# Patient Record
Sex: FEMALE | Race: AMERICAN INDIAN OR ALASKA NATIVE
[De-identification: names, ages, dates, MRNs, and addresses within clinical notes are randomized per-mention and may not be internally consistent; named-entity substitution may affect disease eponyms.]

---

## 2023-07-27 ENCOUNTER — HOSPITAL ENCOUNTER (OUTPATIENT)
Dept: HOSPITAL 47 - RADUSWWP | Age: 53
Discharge: HOME | End: 2023-07-27
Attending: INTERNAL MEDICINE
Payer: COMMERCIAL

## 2023-07-27 DIAGNOSIS — K83.8: ICD-10-CM

## 2023-07-27 DIAGNOSIS — Z80.3: ICD-10-CM

## 2023-07-27 DIAGNOSIS — R94.5: ICD-10-CM

## 2023-07-27 DIAGNOSIS — K76.0: ICD-10-CM

## 2023-07-27 DIAGNOSIS — Z12.31: ICD-10-CM

## 2023-07-27 DIAGNOSIS — Z01.419: Primary | ICD-10-CM

## 2023-07-27 DIAGNOSIS — Z90.49: ICD-10-CM

## 2023-07-27 PROCEDURE — 77063 BREAST TOMOSYNTHESIS BI: CPT

## 2023-07-27 PROCEDURE — 76705 ECHO EXAM OF ABDOMEN: CPT

## 2023-07-27 PROCEDURE — 77067 SCR MAMMO BI INCL CAD: CPT

## 2023-07-27 NOTE — US
EXAMINATION TYPE: US liver

 

DATE OF EXAM: 7/27/2023

 

COMPARISON: NONE

 

CLINICAL INDICATION: Female, 53 years old with history of R94.5 ABNORMAL RESULTS OF LIVER FUNCTION ST
UDIES; Elevated liver enzymes. Cholecystectomy. RUQ pain 

 

TECHNIQUE: Multiple sonographic images of the right upper quadrant are obtained.

 

FINDINGS:

 

EXAM MEASUREMENTS:

 

Liver Length:  18.8 cm   

Gallbladder:  Surgically absent  

CBD:  0.9 cm

Right Kidney:  13.7 x 5.6 x 5.5 cm

 

SONOGRAPHER NOTES: *Technical limitations due to patient's body habitus and overlying bowel gas 

Unable

Pancreas:  Obscured by bowel gas

Liver:  attenuating. Unable to penetrate   

Gallbladder:  Surgically absent

**Evidence for sonographic Galinod's sign:  no

CBD: Dilated.

Right Kidney:  no evidence of hydronephrosis  

 

 

 

IMPRESSION: 

 

1. Severe hepatic steatosis. Correlate with LFTs, lipid profile, patient risk factors. The degree of 
fatty infiltration limits assessment for focal lesions.

2. Bile duct dilated at 9 mm may be normal for the patient status post cholecystectomy.

## 2023-07-28 NOTE — MM
Reason for Exam: Screening  (asymptomatic). 

Last mammogram was performed 1 year(s) and 1 month(s) ago. 





Patient History: 

Menarche at age 11. Patient has no children.

Paternal cousin had breast cancer, age 50. Paternal aunt had breast cancer, age 60. 





Risk Values: 

Candy 5 year model risk: 1.3%.

NCI Lifetime model risk: 10.3%.





Prior Study Comparison: 

5/27/2011 Bilateral Screening Mammogram, [facility]. 6/3/2022 Bilateral MG screening mammo w CAD, [facility]. 





Tissue Density: 

There are scattered fibroglandular densities.





Findings: 

Analyzed By CAD. 

There is no suspicious group of microcalcifications or new suspicious mass in either breast. 





Overall Assessment: Benign, BI-RAD 2





Management: 

Screening Mammogram of both breasts in 1 year.

.



Patient should continue monthly self-breast exams.  A clinical breast exam by your physician is

recommended on an annual basis.

This exam should not preclude additional follow-up of suspicious palpable abnormalities.



Note on Candy scores and lifetime risk:

1. A Candy score greater than 3% is considered moderate risk. If this is the case, consider

specialist referral to assess eligibility for a risk reducing agent.

2. If overall lifetime risk for the development of breast cancer is 20% or higher, the patient may

qualify for future screening with alternating mammogram and breast MRI.



Electronically signed and approved by: Leopold M. Fregoli, M.D. Radiologis

## 2024-09-13 ENCOUNTER — HOSPITAL ENCOUNTER (OUTPATIENT)
Dept: HOSPITAL 47 - EC | Age: 54
Setting detail: OBSERVATION
LOS: 2 days | Discharge: HOME | End: 2024-09-15
Attending: HOSPITALIST | Admitting: HOSPITALIST
Payer: COMMERCIAL

## 2024-09-13 DIAGNOSIS — E11.65: ICD-10-CM

## 2024-09-13 DIAGNOSIS — Z79.4: ICD-10-CM

## 2024-09-13 DIAGNOSIS — E78.5: ICD-10-CM

## 2024-09-13 DIAGNOSIS — Z91.141: ICD-10-CM

## 2024-09-13 DIAGNOSIS — E55.9: ICD-10-CM

## 2024-09-13 DIAGNOSIS — F41.9: ICD-10-CM

## 2024-09-13 DIAGNOSIS — I10: ICD-10-CM

## 2024-09-13 DIAGNOSIS — R07.89: Primary | ICD-10-CM

## 2024-09-13 DIAGNOSIS — Z79.1: ICD-10-CM

## 2024-09-13 DIAGNOSIS — Z79.84: ICD-10-CM

## 2024-09-13 DIAGNOSIS — Z79.899: ICD-10-CM

## 2024-09-13 PROCEDURE — 80053 COMPREHEN METABOLIC PANEL: CPT

## 2024-09-13 PROCEDURE — 84484 ASSAY OF TROPONIN QUANT: CPT

## 2024-09-13 PROCEDURE — 83036 HEMOGLOBIN GLYCOSYLATED A1C: CPT

## 2024-09-13 PROCEDURE — 99285 EMERGENCY DEPT VISIT HI MDM: CPT

## 2024-09-13 PROCEDURE — 80048 BASIC METABOLIC PNL TOTAL CA: CPT

## 2024-09-13 PROCEDURE — 85610 PROTHROMBIN TIME: CPT

## 2024-09-13 PROCEDURE — 83735 ASSAY OF MAGNESIUM: CPT

## 2024-09-13 PROCEDURE — 81003 URINALYSIS AUTO W/O SCOPE: CPT

## 2024-09-13 PROCEDURE — 85730 THROMBOPLASTIN TIME PARTIAL: CPT

## 2024-09-13 PROCEDURE — 96361 HYDRATE IV INFUSION ADD-ON: CPT

## 2024-09-13 PROCEDURE — 36415 COLL VENOUS BLD VENIPUNCTURE: CPT

## 2024-09-13 PROCEDURE — 96360 HYDRATION IV INFUSION INIT: CPT

## 2024-09-13 PROCEDURE — 71046 X-RAY EXAM CHEST 2 VIEWS: CPT

## 2024-09-13 PROCEDURE — 93005 ELECTROCARDIOGRAM TRACING: CPT

## 2024-09-13 PROCEDURE — 85025 COMPLETE CBC W/AUTO DIFF WBC: CPT

## 2024-09-14 LAB
ALBUMIN SERPL-MCNC: 4.7 G/DL (ref 3.5–5)
ALP SERPL-CCNC: 198 U/L (ref 38–126)
ALT SERPL-CCNC: 57 U/L (ref 4–34)
ANION GAP SERPL CALC-SCNC: 15 MMOL/L
APTT BLD: 23.9 SEC (ref 22–30)
AST SERPL-CCNC: 44 U/L (ref 14–36)
BASOPHILS # BLD AUTO: 0.1 K/UL (ref 0–0.2)
BASOPHILS NFR BLD AUTO: 1 %
BUN SERPL-SCNC: 12 MG/DL (ref 7–17)
CALCIUM SPEC-MCNC: 9.8 MG/DL (ref 8.4–10.2)
CHLORIDE SERPL-SCNC: 99 MMOL/L (ref 98–107)
CO2 SERPL-SCNC: 19 MMOL/L (ref 22–30)
EOSINOPHIL # BLD AUTO: 0.2 K/UL (ref 0–0.7)
EOSINOPHIL NFR BLD AUTO: 2 %
ERYTHROCYTE [DISTWIDTH] IN BLOOD BY AUTOMATED COUNT: 5 M/UL (ref 3.8–5.4)
ERYTHROCYTE [DISTWIDTH] IN BLOOD: 13.1 % (ref 11.5–15.5)
GLUCOSE BLD-MCNC: 265 MG/DL (ref 70–110)
GLUCOSE BLD-MCNC: 278 MG/DL (ref 70–110)
GLUCOSE BLD-MCNC: 279 MG/DL (ref 70–110)
GLUCOSE BLD-MCNC: 328 MG/DL (ref 70–110)
GLUCOSE BLD-MCNC: 341 MG/DL (ref 70–110)
GLUCOSE BLD-MCNC: 383 MG/DL (ref 70–110)
GLUCOSE SERPL-MCNC: 365 MG/DL (ref 74–99)
GLUCOSE UR QL: (no result)
HCT VFR BLD AUTO: 41.9 % (ref 34–46)
HGB BLD-MCNC: 13.5 GM/DL (ref 11.4–16)
INR PPP: 0.9 (ref ?–1.2)
KETONES UR QL STRIP.AUTO: (no result)
LYMPHOCYTES # SPEC AUTO: 3.8 K/UL (ref 1–4.8)
LYMPHOCYTES NFR SPEC AUTO: 37 %
MAGNESIUM SPEC-SCNC: 1.6 MG/DL (ref 1.6–2.3)
MCH RBC QN AUTO: 27.1 PG (ref 25–35)
MCHC RBC AUTO-ENTMCNC: 32.3 G/DL (ref 31–37)
MCV RBC AUTO: 83.8 FL (ref 80–100)
MONOCYTES # BLD AUTO: 0.6 K/UL (ref 0–1)
MONOCYTES NFR BLD AUTO: 6 %
NEUTROPHILS # BLD AUTO: 5.3 K/UL (ref 1.3–7.7)
NEUTROPHILS NFR BLD AUTO: 51 %
PH UR: 5.5 [PH] (ref 5–8)
PLATELET # BLD AUTO: 428 K/UL (ref 150–450)
POTASSIUM SERPL-SCNC: 4 MMOL/L (ref 3.5–5.1)
PROT SERPL-MCNC: 7.6 G/DL (ref 6.3–8.2)
PT BLD: 10 SEC (ref 10–12.5)
SODIUM SERPL-SCNC: 133 MMOL/L (ref 137–145)
SP GR UR: 1.04 (ref 1–1.03)
UROBILINOGEN UR QL STRIP: <2 MG/DL (ref ?–2)
WBC # BLD AUTO: 10.2 K/UL (ref 3.8–10.6)

## 2024-09-14 RX ADMIN — CEFAZOLIN SCH MLS/HR: 330 INJECTION, POWDER, FOR SOLUTION INTRAMUSCULAR; INTRAVENOUS at 06:43

## 2024-09-14 RX ADMIN — INSULIN HUMAN ONE UNIT: 100 INJECTION, SOLUTION PARENTERAL at 03:20

## 2024-09-14 RX ADMIN — IBUPROFEN PRN MG: 600 TABLET ORAL at 13:47

## 2024-09-14 RX ADMIN — ASPIRIN 81 MG CHEWABLE TABLET STA MG: 81 TABLET CHEWABLE at 03:22

## 2024-09-14 RX ADMIN — CEFAZOLIN STA MLS/HR: 330 INJECTION, POWDER, FOR SOLUTION INTRAMUSCULAR; INTRAVENOUS at 03:20

## 2024-09-14 RX ADMIN — ACETAMINOPHEN PRN MG: 325 TABLET, FILM COATED ORAL at 06:43

## 2024-09-14 RX ADMIN — INSULIN ASPART SCH UNIT: 100 INJECTION, SOLUTION INTRAVENOUS; SUBCUTANEOUS at 13:02

## 2024-09-14 NOTE — P.CRDCN
History of Present Illness


Consult date: 09/14/24


History of present illness: 





The patient is a very pleasant 54-year-old female patient with a past medical 

history significant for overweight as well as diabetes and hypertension and 

dyslipidemia presented to the hospital complaining of uncontrolled diabetes and 

elevated blood glucose.  The patient currently is under a lot of stress taking 

care of multiple family member as well as stress related to job and 

affordability of her medications including the insulin.  She has not been 

compliant in taking her diabetic medications including insulin intermittently 

because she could not afford some of them.  She noticed that her glucose in the 

blood has been severely elevated and reaching almost 400.  She decided to come 

for further evaluation.  She was experiencing symptoms of pressure in the head 

and sometimes she reports intermittent episode of chest discomfort appears to be

overall atypical.  No shortness of breath and no dizziness or lightheadedness 

and no feeling of heart racing or fluttering and no presyncope or syncope and no

edema in the lower extremities.  Further investigation performed including blood

work showed severely elevated glucose and also she underwent troponin with 3 

sets came in to be unremarkable and EKG showing sinus mechanism with no s

ignificant ST or T wave abnormalities.  No history of coronary artery disease or

congestive heart failure or cardiac arrhythmia and never seen a cardiologist 

before.  Her pressure appears to be under good control on the current dose of 

lisinopril which she is on.  The physical examination is remarkable for stable 

vital signs with regular rate and rhythm and distant heart sounds and clear 

breathing sounds bilaterally and no edema was noted in the lower extremities





Assessment


Uncontrolled diabetes


Noncompliance with medications secondary to affordability


Atypical versus noncardiac chest discomfort


Multiple comorbid conditions





Plan


Acute coronary event was ruled out


Currently the patient is asymptomatic


I would not advise any further cardiac workup at this point probably she will 

benefit from stress probably as an outpatient


Monitor the patient for additional 24-hour





Past Medical History


Past Medical History: Diabetes Mellitus


Additional Past Medical History / Comment(s): back pain, herniated disc/bulges


History of Any Multi-Drug Resistant Organisms: None Reported


Past Surgical History: Cholecystectomy


Past Psychological History: No Psychological Hx Reported


Smoking Status: Never smoker


Past Alcohol Use History: None Reported


Past Drug Use History: None Reported





Medications and Allergies


                                Home Medications











 Medication  Instructions  Recorded  Confirmed  Type


 


ALPRAZolam [Xanax] 0.25 mg PO BID PRN 09/14/24 09/14/24 History


 


Cyclobenzaprine [Flexeril] 10 mg PO DAILY 09/14/24 09/14/24 History


 


Ergocalciferol (Vitamin D2) 1,250 mcg PO MO 09/14/24 09/14/24 History





[Drisdol (50,000 Iu)]    


 


Insulin Glargine,Hum.rec.anlog 40 units SQ DAILY 09/14/24 09/14/24 History





[Lantus Solostar Pen]    


 


Meloxicam [Mobic] 7.5 mg PO DAILY PRN 09/14/24 09/14/24 History


 


Metoprolol Succinate (ER) [Toprol 25 mg PO DAILY 09/14/24 09/14/24 History





Xl]    


 


Omeprazole [PriLOSEC] 20 mg PO DAILY 09/14/24 09/14/24 History


 


Pioglitazone [Actos] 15 mg PO DAILY 09/14/24 09/14/24 History


 


lisinopriL [Zestril] 10 mg PO DAILY 09/14/24 09/14/24 History


 


metFORMIN HCL [Glucophage] 1,000 mg PO BID 09/14/24 09/14/24 History








                                    Allergies











Allergy/AdvReac Type Severity Reaction Status Date / Time


 


No Known Allergies Allergy   Verified 09/14/24 09:06














Physical Exam


Vitals: 


                                   Vital Signs











  Temp Pulse Pulse Resp BP BP Pulse Ox


 


 09/14/24 13:18  97.9 F   104 H  16   125/81  97


 


 09/14/24 13:04  97.6 F  101 H   20  143/72   95


 


 09/14/24 09:07  98.6 F  94   18  126/60   96


 


 09/14/24 06:44   97   18  130/69   97


 


 09/14/24 02:41   100   18  140/82   97


 


 09/13/24 23:53  97.9 F  111 H   20  162/77   98








                                Intake and Output











 09/14/24 09/14/24 09/14/24





 06:59 14:59 22:59


 


Other:   


 


  # Voids  1 


 


  Weight 127.006 kg 127.006 kg 














Results





                                 09/14/24 02:58





                                 09/14/24 02:58


                                 Cardiac Enzymes











  09/14/24 09/14/24 09/14/24 Range/Units





  02:58 02:58 06:38 


 


AST  44 H    (14-36)  U/L


 


Troponin I   <0.012  <0.012  (0.000-0.034)  ng/mL














  09/14/24 Range/Units





  09:29 


 


AST   (14-36)  U/L


 


Troponin I  <0.012  (0.000-0.034)  ng/mL








                                   Coagulation











  09/14/24 Range/Units





  02:58 


 


PT  10.0  (10.0-12.5)  sec


 


APTT  23.9  (22.0-30.0)  sec








                                       CBC











  09/14/24 Range/Units





  02:58 


 


WBC  10.2  (3.8-10.6)  k/uL


 


RBC  5.00  (3.80-5.40)  m/uL


 


Hgb  13.5  (11.4-16.0)  gm/dL


 


Hct  41.9  (34.0-46.0)  %


 


Plt Count  428  (150-450)  k/uL








                          Comprehensive Metabolic Panel











  09/14/24 Range/Units





  02:58 


 


Sodium  133 L  (137-145)  mmol/L


 


Potassium  4.0  (3.5-5.1)  mmol/L


 


Chloride  99  ()  mmol/L


 


Carbon Dioxide  19 L  (22-30)  mmol/L


 


BUN  12  (7-17)  mg/dL


 


Creatinine  0.43 L  (0.52-1.04)  mg/dL


 


Glucose  365 H  (74-99)  mg/dL


 


Calcium  9.8  (8.4-10.2)  mg/dL


 


AST  44 H  (14-36)  U/L


 


ALT  57 H  (4-34)  U/L


 


Alkaline Phosphatase  198 H  ()  U/L


 


Total Protein  7.6  (6.3-8.2)  g/dL


 


Albumin  4.7  (3.5-5.0)  g/dL








                               Current Medications











Generic Name Dose Route Start Last Admin





  Trade Name Aayushq  PRN Reason Stop Dose Admin


 


Acetaminophen  650 mg  09/14/24 04:38  09/14/24 06:43





  Acetaminophen Tab 325 Mg Tab  PO   650 mg





  Q6HR PRN   Administration





  Mild Pain or Fever > 100.5  


 


Alprazolam  0.25 mg  09/14/24 10:46 





  Alprazolam 0.25 Mg Tab  PO  





  BID PRN  





  Anxiety  


 


Cyclobenzaprine HCl  10 mg  09/15/24 09:00 





  Cyclobenzaprine 10 Mg Tab  PO  





  DAILY JERICA  


 


Dextrose/Water  25 ml  09/14/24 12:35 





  Dextrose 50% Syringe 50 Ml  IVP  





  PER PROTOCOL PRN  





  Hypoglycemia  





  Protocol  


 


Dextrose/Water  50 ml  09/14/24 12:35 





  Dextrose 50% Syringe 50 Ml  IVP  





  PER PROTOCOL PRN  





  Hypoglycemia  





  Protocol  


 


Sodium Chloride  1,000 mls @ 10 mls/hr  09/14/24 04:45  09/14/24 16:26





  Saline 0.9%  IV   Not Given





  .Q24H JERICA  


 


Ibuprofen  600 mg  09/14/24 13:33  09/14/24 13:47





  Ibuprofen 600 Mg Tab  PO   600 mg





  QID PRN   Administration





  Mild Pain (Scale 1 to 3)  


 


Insulin Aspart  0 unit  09/14/24 12:45  09/14/24 13:02





  Insulin Aspart (Novolog) 100 Unit/Ml Vial  SQ   15 unit





  ACHS JERICA   Administration





  Protocol  


 


Lisinopril  10 mg  09/15/24 09:00 





  Lisinopril 10 Mg Tab  PO  





  DAILY [facility]  


 


Metoprolol Succinate  25 mg  09/15/24 09:00 





  Metoprolol Succinate (Er) 25 Mg Tab.Er.24h  PO  





  DAILY [facility]  


 


Naloxone HCl  0.2 mg  09/14/24 04:38 





  Naloxone 0.4 Mg/Ml 1 Ml Vial  IV  





  Q2M PRN  





  Opioid Reversal  


 


Pantoprazole Sodium  40 mg  09/15/24 07:30 





  Pantoprazole 40 Mg Tablet  PO  





  AC-BRKFST [facility]  








                                Intake and Output











 09/14/24 09/14/24 09/14/24





 06:59 14:59 22:59


 


Other:   


 


  # Voids  1 


 


  Weight 127.006 kg 127.006 kg 








                                 Patient Weight











 09/15/24





 06:59


 


Weight 127.006 kg








                                        





                                 09/14/24 02:58 





                                 09/14/24 02:58

## 2024-09-14 NOTE — XR
EXAMINATION TYPE: XR chest 2V

 

DATE OF EXAM: 9/14/2024 6:15 AM

 

CLINICAL INDICATION: Female, 54 years old with history of Chest Pain; [facility]

 

COMPARISON: Chest radiographs from 11/14/2013

 

TECHNIQUE: XR chest 2V Frontal view of the chest.

 

FINDINGS: 

Lungs/Pleura: There is no evidence of pleural effusion, focal consolidation, or pneumothorax.  

Pulmonary vascularity: Unremarkable.

Heart/mediastinum: Cardiomediastinal silhouette is unremarkable.

Musculoskeletal: No acute osseous pathology.

 

Other findings: None

 

IMPRESSION: 

No acute cardiopulmonary disease/process.

## 2024-09-14 NOTE — P.HPIM
History of Present Illness


H&P Date: 09/14/24


Chief Complaint: Chest pain





54-year-old female, history of hypertension, diabetes mellitus 2, DJD/back pain,

presenting with chief complaint of elevated blood sugar.  Patient states that 

she normally does not check her blood sugar but states that today she was 

getting headache, blurred vision, and dry mouth.  She states that when she 

checked her blood sugar the machine read high.  She states that she has been 

under a lot of stress recently.  She did take her Lantus late today.  Patient 

also reports diarrhea, nausea, chest pain.  Chest pain located on the left side 

with some radiation into the left arm.  No difficulty breathing.  No cough, 

congestion, sore throat.  States that she is having dysuria and urinary urgency 

and frequency.  No fevers or chills.


Blood work completed in ED reveals a WBC of 10.2, hemoglobin of 13.5 and 

platelet count of 428, sodium 133, potassium 4.0, BUNs/creatinine of 12/0.43 and

blood glucose of 365, troponin is less than 0.012





Review of Systems











REVIEW OF SYSTEMS: 


CONSTITUTIONAL: No fever, no malaise, no fatigue. 


HEENT: No recent visual problems or hearing problems. Denied any sore throat. 


CARDIOVASCULAR: No chest pain, orthopnea, PND, no palpitations, no syncope. 


PULMONARY: No shortness of breath, no cough, no hemoptysis. 


GASTROINTESTINAL: No diarrhea, no nausea, no vomiting, no abdominal pain. 


NEUROLOGICAL: No headaches, no weakness, no numbness. 


HEMATOLOGICAL: Denies any bleeding or petechiae. 


GENITOURINARY: Denies any burning micturition, frequency, or urgency. 


MUSCULOSKELETAL/RHEUMATOLOGICAL: Denies any joint pain, swelling, or any muscle 

pain. 


ENDOCRINE: Denies any polyuria or polydipsia. 





The rest of the 14-point review of systems is negative.








Past Medical History


Past Medical History: Diabetes Mellitus


Additional Past Medical History / Comment(s): back pain


History of Any Multi-Drug Resistant Organisms: None Reported


Past Surgical History: Cholecystectomy


Past Psychological History: No Psychological Hx Reported


Smoking Status: Never smoker


Past Alcohol Use History: None Reported


Past Drug Use History: None Reported





Medications and Allergies


                                Home Medications











 Medication  Instructions  Recorded  Confirmed  Type


 


ALPRAZolam [Xanax] 0.25 mg PO BID PRN 09/14/24 09/14/24 History


 


Cyclobenzaprine [Flexeril] 10 mg PO DAILY 09/14/24 09/14/24 History


 


Ergocalciferol (Vitamin D2) 1,250 mcg PO MO 09/14/24 09/14/24 History





[Drisdol (50,000 Iu)]    


 


Insulin Glargine,Hum.rec.anlog 40 units SQ DAILY 09/14/24 09/14/24 History





[Lantus Solostar Pen]    


 


Meloxicam [Mobic] 7.5 mg PO DAILY PRN 09/14/24 09/14/24 History


 


Metoprolol Succinate (ER) [Toprol 25 mg PO DAILY 09/14/24 09/14/24 History





Xl]    


 


Omeprazole [PriLOSEC] 20 mg PO DAILY 09/14/24 09/14/24 History


 


Pioglitazone [Actos] 15 mg PO DAILY 09/14/24 09/14/24 History


 


lisinopriL [Zestril] 10 mg PO DAILY 09/14/24 09/14/24 History


 


metFORMIN HCL [Glucophage] 1,000 mg PO BID 09/14/24 09/14/24 History








                                    Allergies











Allergy/AdvReac Type Severity Reaction Status Date / Time


 


No Known Allergies Allergy   Verified 09/14/24 09:06














Physical Exam


Vitals: 


                                   Vital Signs











  Temp Pulse Resp BP Pulse Ox


 


 09/14/24 09:07  98.6 F  94  18  126/60  96


 


 09/14/24 06:44   97  18  130/69  97


 


 09/14/24 02:41   100  18  140/82  97


 


 09/13/24 23:53  97.9 F  111 H  20  162/77  98








                                Intake and Output











 09/13/24 09/14/24 09/14/24





 22:59 06:59 14:59


 


Other:   


 


  Weight  127.006 kg 














General appearance: alert, in no apparent distress


Head exam: Present: atraumatic, normocephalic


Eye exam: Present: normal appearance, EOMI


Neck exam: Present: normal inspection.  Absent: meningismus


Respiratory exam: Present: normal lung sounds bilaterally.  Absent: respiratory 

distress, wheezes, rales, rhonchi, stridor


Cardiovascular Exam: Present: regular rate, normal rhythm, normal heart sounds. 

 Absent: systolic murmur, diastolic murmur, rubs, gallop, clicks


Neurological exam: Present: alert, oriented X3


Psychiatric exam: Present: normal affect, normal mood


Skin exam: Present: warm, dry





Results


CBC & Chem 7: 


                                 09/14/24 02:58





                                 09/14/24 02:58


Labs: 


                  Abnormal Lab Results - Last 24 Hours (Table)











  09/14/24 09/14/24 09/14/24 Range/Units





  02:35 02:58 06:50 


 


Sodium   133 L   (137-145)  mmol/L


 


Carbon Dioxide   19 L   (22-30)  mmol/L


 


Creatinine   0.43 L   (0.52-1.04)  mg/dL


 


Glucose   365 H   (74-99)  mg/dL


 


POC Glucose (mg/dL)  341 H   328 H  ()  mg/dL


 


AST   44 H   (14-36)  U/L


 


ALT   57 H   (4-34)  U/L


 


Alkaline Phosphatase   198 H   ()  U/L














Assessment and Plan


Assessment: 





1.  Chest pain rule out acute coronary syndrome


-Patient has been admitted to telemetry; monitor EKG and cycle troponins


-- Recommend 2D echo


-Cardiology has been consulted





2.  Hypertension; continue with home dose of lisinopril 10 mg daily and 

metoprolol succinate 25 mg daily





3.  Diabetes mellitus type 2 with long-term insulin use/hyperglycemia


-Patient uses insulin glargine 40 units daily along with Actos 15 mg daily


-Long-acting insulin placed on hold while in hospital' we will monitor Accu-

Cheks q. ACH S with insulin sliding scale





4.  Anxiety; continue with home dose of Xanax





5.  Vitamin D deficiency; patient will continue with supplement outpatient





VTE prophylaxis; SCDs


CODE STATUS; full code

## 2024-09-14 NOTE — ED
General Adult HPI





- General


Chief complaint: Recheck/Abnormal Lab/Rx


Stated complaint: Diabetic Issues


Time Seen by Provider: 09/14/24 02:36


Source: patient


Mode of arrival: ambulatory


Limitations: no limitations





- History of Present Illness


Initial comments: 


54-year-old female with history of type 2 diabetes presenting with chief 

complaint of elevated blood sugar.  Patient states that she normally does not 

check her blood sugar but states that today she was getting headache, blurred 

vision, and dry mouth.  She states that when she checked her blood sugar the 

machine read high.  She states that she has been under a lot of stress recently.

 She did take her Lantus late today.  Patient also reports diarrhea, nausea, 

chest pain.  Chest pain located on the left side with some radiation into the 

left arm.  No difficulty breathing.  No cough, congestion, sore throat.  States 

that she is having dysuria and urinary urgency and frequency.  No fevers or 

chills.








- Related Data


                                Home Medications











 Medication  Instructions  Recorded  Confirmed


 


ALPRAZolam [Xanax] 0.25 mg PO BID PRN 09/14/24 09/14/24


 


Cyclobenzaprine [Flexeril] 10 mg PO DAILY 09/14/24 09/14/24


 


Ergocalciferol (Vitamin D2) 1,250 mcg PO MO 09/14/24 09/14/24





[Drisdol (50,000 Iu)]   


 


Insulin Glargine,Hum.rec.anlog 40 units SQ DAILY 09/14/24 09/14/24





[Lantus Solostar Pen]   


 


Meloxicam [Mobic] 7.5 mg PO DAILY PRN 09/14/24 09/14/24


 


Metoprolol Succinate (ER) [Toprol 25 mg PO DAILY 09/14/24 09/14/24





Xl]   


 


Omeprazole [PriLOSEC] 20 mg PO DAILY 09/14/24 09/14/24


 


Pioglitazone [Actos] 15 mg PO DAILY 09/14/24 09/14/24


 


lisinopriL [Zestril] 10 mg PO DAILY 09/14/24 09/14/24


 


metFORMIN HCL [Glucophage] 1,000 mg PO BID 09/14/24 09/14/24











                                    Allergies











Allergy/AdvReac Type Severity Reaction Status Date / Time


 


No Known Allergies Allergy   Verified 09/14/24 09:06














Review of Systems


ROS Statement: 


Those systems with pertinent positive or pertinent negative responses have been 

documented in the HPI.





ROS Other: All systems not noted in ROS Statement are negative.





Past Medical History


Past Medical History: Diabetes Mellitus


Additional Past Medical History / Comment(s): back pain


History of Any Multi-Drug Resistant Organisms: None Reported


Past Surgical History: Cholecystectomy


Past Psychological History: No Psychological Hx Reported


Smoking Status: Never smoker


Past Alcohol Use History: None Reported


Past Drug Use History: None Reported





General Exam


Limitations: no limitations


General appearance: alert, in no apparent distress


Head exam: Present: atraumatic, normocephalic


Eye exam: Present: normal appearance, EOMI


Neck exam: Present: normal inspection.  Absent: meningismus


Respiratory exam: Present: normal lung sounds bilaterally.  Absent: respiratory 

distress, wheezes, rales, rhonchi, stridor


Cardiovascular Exam: Present: regular rate, normal rhythm, normal heart sounds. 

 Absent: systolic murmur, diastolic murmur, rubs, gallop, clicks


Neurological exam: Present: alert, oriented X3


Psychiatric exam: Present: normal affect, normal mood


Skin exam: Present: warm, dry





Course


                                   Vital Signs











  09/13/24 09/14/24 09/14/24





  23:53 02:41 06:44


 


Temperature 97.9 F  


 


Pulse Rate 111 H 100 97


 


Respiratory 20 18 18





Rate   


 


Blood Pressure 162/77 140/82 130/69


 


O2 Sat by Pulse 98 97 97





Oximetry   














  09/14/24 09/14/24





  09:07 13:04


 


Temperature 98.6 F 97.6 F


 


Pulse Rate 94 101 H


 


Respiratory 18 20





Rate  


 


Blood Pressure 126/60 143/72


 


O2 Sat by Pulse 96 95





Oximetry  














Medical Decision Making





- Medical Decision Making


Was pt. sent in by a medical professional or institution (, PA, NP, urgent 

care, hospital, or nursing home...) When possible be specific


@  -No


Did you speak to anyone other than the patient for history (EMS, parent, family,

 police, friend...)? What history was obtained from this source 


@  -No


Did you review nursing and triage notes (agree or disagree)?  Why? 


@  -I reviewed and agree with nursing and triage notes


Were old charts reviewed (outside hosp., previous admission, EMS record, old 

EKG, old radiological studies, urgent care reports/EKG's, nursing home records)?

 Report findings 


@  -No old charts were reviewed


Differential Diagnosis (chest pain, altered mental status, abdominal pain women,

 abdominal pain men, vaginal bleeding, weakness, fever, dyspnea, syncope, 

headache, dizziness, GI bleed, back pain, seizure, CVA, palpatations, mental 

health, musculoskeletal)? 


@  -[facility] Differential Chest Pain:


Stable Angina, Unstable Angina, STEMI, NSTEMI Aortic Dissection, Pneumothorax, 

Musculoskeletal, Esophageal Spasm GERD, Cholecystitis, Pancreatitis, 

Zoster  This is not meant to be an all-inclusive list. 





EKG interpreted by me (3pts min.).


@  -Sinus tachycardia ventricular rate 100.  AZ interval 134.  QRS 93.  . 

 QTc 408.  No ST deviation 


X-rays interpreted by me (1pt min.).


@  -Chest x-ray shows no acute process


CT interpreted by me (1pt min.).


@  -None done


U/S interpreted by me (1pt. min.).


@  -None done


What testing was considered but not performed or refused? (CT, X-rays, U/S, 

labs)? Why?


@  -None


What meds were considered but not given or refused? Why?


@  -None


Did you discuss the management of the patient with other professionals 

(professionals i.e. , PA, NP, lab, RT, psych nurse, , , 

teacher, , )? Give summary


@  -My attending spoke with the [facility] provider on-call who accepts admission


Was smoking cessation discussed for >3mins.?


@  -No


Was critical care preformed (if so, how long)?


@  -No


Were there social determinants of health that impacted care today? How? 

(Homelessness, low income, unemployed, alcoholism, drug addiction, 

transportation, low edu. Level, literacy, decrease access to med. care, [facility], 

rehab)?


@  -No


Was there de-escalation of care discussed even if they declined (Discuss DNR or 

withdrawal of care, Hospice)? DNR status


@  -No


What co-morbidities impacted this encounter? (DM, HTN, Smoking, COPD, CAD, 

Cancer, CVA, ARF, Chemo, Hep., AIDS, mental health diagnosis, sleep apnea, 

morbid obesity)?


@  -None


Was patient admitted / discharged? Hospital course, mention meds given and 

route, prescriptions, significant lab abnormalities, going to OR and other 

pertinent info.


@  -54-year-old female presenting with chief complaint of elevated blood sugar. 

 Also complaining of chest pain.  History and physical examination are 

conducted.  Blood sugar is initially 341.  Patient is given 8 units insulin IV. 

 He was also started on 1 L fluid bolus and maintenance rate of 200 mL/h.  

Negative troponin.  Chest x-ray shows no acute process and EKG shows no ischemic

 changes.  Patient will be admitted for observation given her chest pain.  She 

is agreeable with this plan.  I discussed this case with my attending Dr. Montalvo


Undiagnosed new problem with uncertain prognosis?


@  -No


Drug Therapy requiring intensive monitoring for toxicity (Heparin, Nitro, 

Insulin, Cardizem)?


@  -No


Were any procedures done?


@  -No


Diagnosis/symptom?


@  -Chest pain


Acute, or Chronic, or Acute on Chronic?


@  -Acute


Uncomplicated (without systemic symptoms) or Complicated (systemic symptoms)?


@  -Complicated


Side effects of treatment?


@  -No


Exacerbation, Progression, or Severe Exacerbation?


@  -No


Poses a threat to life or bodily function? How? (Chest pain, USA, MI, pneumonia,

 PE, COPD, DKA, ARF, appy, cholecystitis, CVA, Diverticulitis, Homicidal, 

Suicidal, threat to staff... and all critical care pts)


@  -Yes











- Lab Data


Result diagrams: 


                                 09/14/24 02:58





                                 09/14/24 02:58


                                   Lab Results











  09/14/24 09/14/24 09/14/24 Range/Units





  02:35 02:58 02:58 


 


WBC   10.2   (3.8-10.6)  k/uL


 


RBC   5.00   (3.80-5.40)  m/uL


 


Hgb   13.5   (11.4-16.0)  gm/dL


 


Hct   41.9   (34.0-46.0)  %


 


MCV   83.8   (80.0-100.0)  fL


 


MCH   27.1   (25.0-35.0)  pg


 


MCHC   32.3   (31.0-37.0)  g/dL


 


RDW   13.1   (11.5-15.5)  %


 


Plt Count   428   (150-450)  k/uL


 


MPV   6.8   


 


Neutrophils %   51   %


 


Lymphocytes %   37   %


 


Monocytes %   6   %


 


Eosinophils %   2   %


 


Basophils %   1   %


 


Neutrophils #   5.3   (1.3-7.7)  k/uL


 


Lymphocytes #   3.8   (1.0-4.8)  k/uL


 


Monocytes #   0.6   (0-1.0)  k/uL


 


Eosinophils #   0.2   (0-0.7)  k/uL


 


Basophils #   0.1   (0-0.2)  k/uL


 


PT    10.0  (10.0-12.5)  sec


 


INR    0.9  (<1.2)  


 


APTT    23.9  (22.0-30.0)  sec


 


Sodium     (137-145)  mmol/L


 


Potassium     (3.5-5.1)  mmol/L


 


Chloride     ()  mmol/L


 


Carbon Dioxide     (22-30)  mmol/L


 


Anion Gap     mmol/L


 


BUN     (7-17)  mg/dL


 


Creatinine     (0.52-1.04)  mg/dL


 


Est GFR (CKD-EPI)AfAm     (>60 ml/min/1.73 sqM)  


 


Est GFR (CKD-EPI)NonAf     (>60 ml/min/1.73 sqM)  


 


Glucose     (74-99)  mg/dL


 


POC Glucose (mg/dL)  341 H    ()  mg/dL


 


POC Glu Operater ID  Nathan Palacios    


 


Calcium     (8.4-10.2)  mg/dL


 


Magnesium     (1.6-2.3)  mg/dL


 


Total Bilirubin     (0.2-1.3)  mg/dL


 


AST     (14-36)  U/L


 


ALT     (4-34)  U/L


 


Alkaline Phosphatase     ()  U/L


 


Troponin I     (0.000-0.034)  ng/mL


 


Total Protein     (6.3-8.2)  g/dL


 


Albumin     (3.5-5.0)  g/dL














  09/14/24 09/14/24 Range/Units





  02:58 02:58 


 


WBC    (3.8-10.6)  k/uL


 


RBC    (3.80-5.40)  m/uL


 


Hgb    (11.4-16.0)  gm/dL


 


Hct    (34.0-46.0)  %


 


MCV    (80.0-100.0)  fL


 


MCH    (25.0-35.0)  pg


 


MCHC    (31.0-37.0)  g/dL


 


RDW    (11.5-15.5)  %


 


Plt Count    (150-450)  k/uL


 


MPV    


 


Neutrophils %    %


 


Lymphocytes %    %


 


Monocytes %    %


 


Eosinophils %    %


 


Basophils %    %


 


Neutrophils #    (1.3-7.7)  k/uL


 


Lymphocytes #    (1.0-4.8)  k/uL


 


Monocytes #    (0-1.0)  k/uL


 


Eosinophils #    (0-0.7)  k/uL


 


Basophils #    (0-0.2)  k/uL


 


PT    (10.0-12.5)  sec


 


INR    (<1.2)  


 


APTT    (22.0-30.0)  sec


 


Sodium  133 L   (137-145)  mmol/L


 


Potassium  4.0   (3.5-5.1)  mmol/L


 


Chloride  99   ()  mmol/L


 


Carbon Dioxide  19 L   (22-30)  mmol/L


 


Anion Gap  15   mmol/L


 


BUN  12   (7-17)  mg/dL


 


Creatinine  0.43 L   (0.52-1.04)  mg/dL


 


Est GFR (CKD-EPI)AfAm  >90   (>60 ml/min/1.73 sqM)  


 


Est GFR (CKD-EPI)NonAf  >90   (>60 ml/min/1.73 sqM)  


 


Glucose  365 H   (74-99)  mg/dL


 


POC Glucose (mg/dL)    ()  mg/dL


 


POC Glu Operater ID    


 


Calcium  9.8   (8.4-10.2)  mg/dL


 


Magnesium  1.6   (1.6-2.3)  mg/dL


 


Total Bilirubin  0.5   (0.2-1.3)  mg/dL


 


AST  44 H   (14-36)  U/L


 


ALT  57 H   (4-34)  U/L


 


Alkaline Phosphatase  198 H   ()  U/L


 


Troponin I   <0.012  (0.000-0.034)  ng/mL


 


Total Protein  7.6   (6.3-8.2)  g/dL


 


Albumin  4.7   (3.5-5.0)  g/dL














Disposition


Clinical Impression: 


 Chest pain





Disposition: ADMITTED AS IP TO THIS HOSP


Condition: Fair


Time of Disposition: 04:39

## 2024-09-15 VITALS — HEART RATE: 56 BPM | SYSTOLIC BLOOD PRESSURE: 115 MMHG | TEMPERATURE: 97.8 F | DIASTOLIC BLOOD PRESSURE: 69 MMHG

## 2024-09-15 VITALS — RESPIRATION RATE: 16 BRPM

## 2024-09-15 LAB
ANION GAP SERPL CALC-SCNC: 12.3 MMOL/L (ref 4–12)
BUN SERPL-SCNC: 7.3 MG/DL (ref 9–27)
BUN/CREAT SERPL: 14.6 RATIO (ref 12–20)
CALCIUM SPEC-MCNC: 8.8 MG/DL (ref 8.7–10.3)
CHLORIDE SERPL-SCNC: 102 MMOL/L (ref 96–109)
CO2 SERPL-SCNC: 23.7 MMOL/L (ref 21.6–31.8)
GLUCOSE BLD-MCNC: 288 MG/DL (ref 70–110)
GLUCOSE BLD-MCNC: 349 MG/DL (ref 70–110)
GLUCOSE SERPL-MCNC: 257 MG/DL (ref 70–110)
POTASSIUM SERPL-SCNC: 4.1 MMOL/L (ref 3.5–5.5)
SODIUM SERPL-SCNC: 138 MMOL/L (ref 135–145)

## 2024-09-15 RX ADMIN — METOPROLOL SUCCINATE SCH MG: 25 TABLET, EXTENDED RELEASE ORAL at 07:50

## 2024-09-15 RX ADMIN — PANTOPRAZOLE SODIUM SCH MG: 40 TABLET, DELAYED RELEASE ORAL at 06:54

## 2024-09-15 RX ADMIN — CYCLOBENZAPRINE HYDROCHLORIDE SCH MG: 10 TABLET, FILM COATED ORAL at 07:50

## 2024-09-15 NOTE — P.PN
Subjective


Progress Note Date: 09/15/24





The patient is a very pleasant 54-year-old female patient with a past medical 

history significant for overweight as well as diabetes and hypertension and 

dyslipidemia presented to the hospital complaining of uncontrolled diabetes and 

elevated blood glucose.  The patient currently is under a lot of stress taking 

care of multiple family member as well as stress related to job and 

affordability of her medications including the insulin.  She has not been 

compliant in taking her diabetic medications including insulin intermittently 

because she could not afford some of them.  She noticed that her glucose in the 

blood has been severely elevated and reaching almost 400.  She decided to come 

for further evaluation.  She was experiencing symptoms of pressure in the head 

and sometimes she reports intermittent episode of chest discomfort appears to be

overall atypical.  No shortness of breath and no dizziness or lightheadedness 

and no feeling of heart racing or fluttering and no presyncope or syncope and no

edema in the lower extremities.  Further investigation performed including blood

work showed severely elevated glucose and also she underwent troponin with 3 

sets came in to be unremarkable and EKG showing sinus mechanism with no 

significant ST or T wave abnormalities.  No history of coronary artery disease 

or congestive heart failure or cardiac arrhythmia and never seen a cardiologist 

before.  Her pressure appears to be under good control on the current dose of 

lisinopril which she is on.  The physical examination is remarkable for stable 

vital signs with regular rate and rhythm and distant heart sounds and clear 

breathing sounds bilaterally and no edema was noted in the lower extremities





September 15, 2024


Overall she is feeling better.  No more symptoms of any chest pain or chest 

discomfort or shortness of breath.  Hemodynamically she is stable with good 

blood pressure and heart rate.  The examination is remarkable for regular rhythm

with a soft systolic murmur at the left upper sternal border with clear 

breathing sounds bilaterally and no edema was noted.





Assessment


Uncontrolled diabetes


Noncompliance with medications secondary to affordability


Atypical versus noncardiac chest discomfort


Multiple comorbid conditions





Plan


Acute coronary event was ruled out


Currently the patient is asymptomatic


Consider obtaining an echocardiogram and stress test either as an inpatient or 

as an outpatient.





Objective





- Vital Signs


Vital signs: 


                                   Vital Signs











Temp  97.8 F   09/15/24 07:50


 


Pulse  56 L  09/15/24 07:50


 


Resp  16   09/15/24 07:50


 


BP  115/69   09/15/24 07:50


 


Pulse Ox  97   09/15/24 07:50


 


FiO2      








                                 Intake & Output











 09/14/24 09/15/24 09/15/24





 18:59 06:59 18:59


 


Intake Total 118  236


 


Output Total  1 


 


Balance 118 -1 236


 


Weight 127.006 kg  


 


Intake:   


 


  Oral 118  236


 


Output:   


 


  Urine  1 


 


Other:   


 


  # Voids 1 2 














- Labs


CBC & Chem 7: 


                                 09/14/24 02:58





                                 09/15/24 02:18


Labs: 


                  Abnormal Lab Results - Last 24 Hours (Table)











  09/14/24 09/14/24 09/14/24 Range/Units





  12:32 17:12 20:43 


 


Anion Gap     (4.00-12.00)  mmol/L


 


BUN     (9.0-27.0)  mg/dL


 


Creatinine     (0.6-1.5)  mg/dL


 


Glucose     ()  mg/dL


 


POC Glucose (mg/dL)  383 H  265 H  279 H  ()  mg/dL


 


Hemoglobin A1c     (<=6.0)  %














  09/14/24 09/15/24 09/15/24 Range/Units





  22:30 02:18 02:18 


 


Anion Gap    12.30 H  (4.00-12.00)  mmol/L


 


BUN    7.3 L  (9.0-27.0)  mg/dL


 


Creatinine    0.5 L  (0.6-1.5)  mg/dL


 


Glucose    257 H  ()  mg/dL


 


POC Glucose (mg/dL)  278 H    ()  mg/dL


 


Hemoglobin A1c   11.3 H   (<=6.0)  %














  09/15/24 09/15/24 Range/Units





  06:19 11:25 


 


Anion Gap    (4.00-12.00)  mmol/L


 


BUN    (9.0-27.0)  mg/dL


 


Creatinine    (0.6-1.5)  mg/dL


 


Glucose    ()  mg/dL


 


POC Glucose (mg/dL)  288 H  349 H  ()  mg/dL


 


Hemoglobin A1c    (<=6.0)  %

## 2025-03-03 ENCOUNTER — HOSPITAL ENCOUNTER (EMERGENCY)
Dept: HOSPITAL 47 - EC | Age: 55
Discharge: HOME | End: 2025-03-03
Payer: COMMERCIAL

## 2025-03-03 VITALS — TEMPERATURE: 98 F

## 2025-03-03 VITALS — DIASTOLIC BLOOD PRESSURE: 83 MMHG | HEART RATE: 96 BPM | SYSTOLIC BLOOD PRESSURE: 145 MMHG | RESPIRATION RATE: 18 BRPM

## 2025-03-03 DIAGNOSIS — H11.31: Primary | ICD-10-CM

## 2025-03-03 PROCEDURE — 99283 EMERGENCY DEPT VISIT LOW MDM: CPT

## 2025-03-03 PROCEDURE — 70450 CT HEAD/BRAIN W/O DYE: CPT

## 2025-03-03 RX ADMIN — FLUORESCEIN SODIUM ONE MG: 1 STRIP OPHTHALMIC at 10:27

## 2025-03-03 RX ADMIN — PROPARACAINE HYDROCHLORIDE STA DROPS: 5 SOLUTION/ DROPS OPHTHALMIC at 10:26

## 2025-03-03 NOTE — ED
ENT HPI





- General


Chief complaint: ENT


Stated complaint: right eye broken blood vessel and pressure in head


Time Seen by Provider: 03/03/25 10:20


Source: patient, RN notes reviewed


Mode of arrival: ambulatory


Limitations: no limitations





- History of Present Illness


Initial comments: 





54-year-old female with history of diabetes presenting for right eye redness x 1

day.  States 2 days ago she began to experience pressure right eye radiating to 

the back of the head.  She has been taking Tylenol however symptoms have 

persisted.  This morning, she noticed a red spot in the right eye.  Denies 

direct eye pain, foreign body sensation, changes in the vision.  She does not 

wear contact or glasses.  Denies numbness, tingling, or weakness in any 

extremities.  Denies blood thinners.





- Related Data


                                Home Medications











 Medication  Instructions  Recorded  Confirmed


 


ALPRAZolam [Xanax] 0.25 mg PO BID PRN 09/14/24 09/14/24


 


Cyclobenzaprine [Flexeril] 10 mg PO DAILY 09/14/24 09/14/24


 


Ergocalciferol (Vitamin D2) 1,250 mcg PO MO 09/14/24 09/14/24





[Drisdol (50,000 Iu)]   


 


Insulin Glargine,Hum.rec.anlog 40 units SQ DAILY 09/14/24 09/14/24





[Lantus Solostar Pen]   


 


Meloxicam [Mobic] 7.5 mg PO DAILY PRN 09/14/24 09/14/24


 


Metoprolol Succinate (ER) [Toprol 25 mg PO DAILY 09/14/24 09/14/24





XL]   


 


Omeprazole [PriLOSEC] 20 mg PO DAILY 09/14/24 09/14/24


 


Pioglitazone [Actos] 15 mg PO DAILY 09/14/24 09/14/24


 


lisinopriL [Zestril] 10 mg PO DAILY 09/14/24 09/14/24


 


metFORMIN HCL [Glucophage] 1,000 mg PO BID 09/14/24 09/14/24











                                    Allergies











Allergy/AdvReac Type Severity Reaction Status Date / Time


 


No Known Allergies Allergy   Verified 03/03/25 09:47














Review of Systems


ROS Statement: 


Those systems with pertinent positive or pertinent negative responses have been 

documented in the HPI.





ROS Other: All systems not noted in ROS Statement are negative.





Past Medical History


Past Medical History: Diabetes Mellitus


Additional Past Medical History / Comment(s): back pain


History of Any Multi-Drug Resistant Organisms: None Reported


Past Surgical History: Cholecystectomy


Past Psychological History: No Psychological Hx Reported


Smoking Status: Never smoker


Past Alcohol Use History: None Reported


Past Drug Use History: None Reported





General Exam


Limitations: no limitations


General appearance: alert, in no apparent distress


Head exam: Present: atraumatic, normocephalic, normal inspection


Eye exam: Present: PERRL, EOMI, conjunctival injection, other (Average right 

intraocular pressure 20.  Fluorescein stain negative for corneal abrasions or 

ulcerations.  Visual acuity 20/25 bilaterally).  Absent: normal appearance 

(There is a small subconjunctival hemorrhage on superior aspect of eye), scleral

 icterus, periorbital swelling


ENT exam: Present: normal exam, mucous membranes moist, TM's normal bilaterally


Neck exam: Present: normal inspection.  Absent: tenderness, meningismus, 

lymphadenopathy


Respiratory exam: Present: normal lung sounds bilaterally.  Absent: respiratory 

distress, wheezes, rales, rhonchi, stridor


Cardiovascular Exam: Present: regular rate, normal rhythm, normal heart sounds. 

 Absent: systolic murmur, diastolic murmur, rubs, gallop, clicks


Neurological exam: Present: alert, oriented X3, CN II-XII intact


Psychiatric exam: Present: normal affect, normal mood


Skin exam: Present: warm, dry, intact, normal color.  Absent: rash





Course


                                   Vital Signs











  03/03/25





  09:45


 


Temperature 98 F


 


Pulse Rate 97


 


Respiratory 20





Rate 


 


Blood Pressure 122/80


 


O2 Sat by Pulse 97





Oximetry 














Medical Decision Making





- Medical Decision Making





Was pt. sent in by a medical professional or institution (, PA, NP, urgent 

care, hospital, or nursing home...) When possible be specific


@ -No


Did you speak to anyone other than the patient for history (EMS, parent, family,

 police, friend...)? What history was obtained from this source 


@ -No


Did you review nursing and triage notes (agree or disagree)? Why? 


@ -I reviewed and agree with nursing and triage notes


Were old charts reviewed (outside hosp., previous admission, EMS record, old 

EKG, old radiological studies, urgent care reports/EKG's, nursing home records)?

 Report findings 


@ -No old charts were reviewed


Differential Diagnosis (chest pain, altered mental status, abdominal pain women,

 abdominal pain men, vaginal bleeding, weakness, fever, dyspnea, syncope, 

headache, dizziness, GI bleed, back pain, seizure, CVA, palpatations, mental hea

lth, musculoskeletal)? 


@ -Subconjunctival hemorrhage, corneal abrasion, corneal ulceration, giant cell 

arteritis, hyphema


EKG interpreted by me (3pts min.).


@ -None


X-rays interpreted by me (1pt min.).


@ -None done


CT interpreted by me (1pt min.).


@ -CT brain reveals no acute process


U/S interpreted by me (1pt. min.).


@ -None done


What testing was considered but not performed or refused? (CT, X-rays, U/S, 

labs)? Why?


@ -None


What meds were considered but not given or refused? Why?


@ -None


Did you discuss the management of the patient with other professionals 

(professionals i.e. , PA, NP, lab, RT, psych nurse, , , 

teacher, , )? Give summary


@ -No


Was smoking cessation discussed for >3mins.?


@ -No


Was critical care preformed (if so, how long)?


@ -No


Were there social determinants of health that impacted care today? How? 

(Homelessness, low income, unemployed, alcoholism, drug addiction, 

transportation, low edu. Level, literacy, decrease access to med. care, [facility], 

rehab)?


@ -No


Was there de-escalation of care discussed even if they declined (Discuss DNR or 

withdrawal of care, Hospice)? DNR status


@ -No


What co-morbidities impacted this encounter? (DM, HTN, Smoking, COPD, CAD, 

Cancer, CVA, ARF, Chemo, Hep., AIDS, mental health diagnosis, sleep apnea, 

morbid obesity)?


@ -None


Was patient admitted / discharged? Hospital course, mention meds given and 

route, prescriptions, significant lab abnormalities, going to OR and other 

pertinent info.


@ -Discharge.  54-year-old female with redness to right eye with associated 

headache x 2 days.  Vital signs within acceptable limits.  Average right 

intraocular pressure 20.  Fluorescein stain negative.  No temporal tenderness.  

Visual acuity 20/30 bilaterally.  Pain reveals no acute process.  Discussed 

diagnosis of subconjunctival hemorrhage discussed with patient.  Appropriate 

return precautions and follow-up care discussed.  Case was discussed with my ED 

attending Dr. Carballo.


Undiagnosed new problem with uncertain prognosis?


@ -No


Drug Therapy requiring intensive monitoring for toxicity (Heparin, Nitro, 

Insulin, Cardizem)?


@ -No


Were any procedures done?


@ -No


Diagnosis/symptom?


@ -Subconjunctival hemorrhage of right eye


Acute, or Chronic, or Acute on Chronic?


@ -Acute


Uncomplicated (without systemic symptoms) or Complicated (systemic symptoms)?


@ -Uncomplicated


Side effects of treatment?


@ -No


Exacerbation, Progression, or Severe Exacerbation?


@ -No


Poses a threat to life or bodily function? How? (Chest pain, USA, MI, pneumonia,

 PE, COPD, DKA, ARF, appy, cholecystitis, CVA, Diverticulitis, Homicidal, 

Suicidal, threat to staff... and all critical care pts)


@ -No





Disposition


Clinical Impression: 


 Subconjunctival hemorrhage





Disposition: HOME SELF-CARE


Condition: Stable


Instructions (If sedation given, give patient instructions):  Subconjunctival 

Hemorrhage (ED)


Additional Instructions: 


Please return to the Emergency Department if symptoms worsen or any other 

concerns.


Is patient prescribed a controlled substance at d/c from ED?: No


Referrals: 


Cyndee Velázquez MD [Primary Care Provider] - 1-2 days


Time of Disposition: 11:40

## 2025-03-03 NOTE — CT
EXAMINATION TYPE: CT brain wo con

CT DLP: 1095.4 mGycm, Automated exposure control for dose reduction was used.

 

DATE OF EXAM: 3/3/2025 11:10 AM

 

COMPARISON: None.

 

CLINICAL INDICATION:Female, 54 years old with history of headache x 2 days, Headache x 2 days

 

TECHNIQUE: 

Brain: Multiple axial CT images of the brain were obtained without IV contrast. . Coronal and sagitta
l reformats reviewed.

 

FINDINGS:

 

Brain:

Extra-axial spaces: No abnormal extra-axial fluid collections.

Ventricular system: Within normal limits

Cerebral parenchyma: No acute intraparenchymal hemorrhage or mass effect.  The gray-white junction is
 well differentiated. 

Cerebellum: Unremarkable.

Mass effect: No evidence of midline shift.

Intracranial vasculature: unremarkable

Soft tissues: Normal.

Calvarium/osseous structures: No depressed skull fracture.

Paranasal sinuses and mastoid air cells: Mastoid air cells are clear. Air-fluid level with mucosal th
ickening in the left sphenoid sinus.

Visualized orbits: Orbital contents are intact.

 

 

IMPRESSION:

1.  No acute intracranial process.

2.  Left sphenoid sinus air-fluid level with mucosal thickening. Correlate for acute sinusitis.

 

[facility], Workstation: [id number], 3/3/2025 11:20 AM